# Patient Record
Sex: MALE | Race: WHITE | NOT HISPANIC OR LATINO | Employment: OTHER | URBAN - METROPOLITAN AREA
[De-identification: names, ages, dates, MRNs, and addresses within clinical notes are randomized per-mention and may not be internally consistent; named-entity substitution may affect disease eponyms.]

---

## 2018-12-18 ENCOUNTER — OFFICE VISIT (OUTPATIENT)
Dept: INTERNAL MEDICINE | Facility: CLINIC | Age: 43
End: 2018-12-18
Payer: COMMERCIAL

## 2018-12-18 VITALS
HEART RATE: 74 BPM | BODY MASS INDEX: 29.31 KG/M2 | HEIGHT: 73 IN | TEMPERATURE: 99 F | SYSTOLIC BLOOD PRESSURE: 120 MMHG | DIASTOLIC BLOOD PRESSURE: 80 MMHG | WEIGHT: 221.13 LBS

## 2018-12-18 DIAGNOSIS — R41.840 LACK OF CONCENTRATION: Primary | ICD-10-CM

## 2018-12-18 DIAGNOSIS — F41.9 ANXIETY: ICD-10-CM

## 2018-12-18 PROCEDURE — 99202 OFFICE O/P NEW SF 15 MIN: CPT | Mod: S$GLB,,, | Performed by: FAMILY MEDICINE

## 2018-12-18 PROCEDURE — 99999 PR PBB SHADOW E&M-NEW PATIENT-LVL III: CPT | Mod: PBBFAC,,, | Performed by: FAMILY MEDICINE

## 2018-12-18 PROCEDURE — 3008F BODY MASS INDEX DOCD: CPT | Mod: CPTII,S$GLB,, | Performed by: FAMILY MEDICINE

## 2018-12-18 NOTE — PROGRESS NOTES
"Subjective:      Patient ID: Martín Bae is a 43 y.o. male.    Chief Complaint: Establish Care (focus issues/anxiety/)    HPI  42 yo male here to establish care.  Reports ADHD as middle/high school kid.  Used to see Dr. Macarena Ramos  Has some anxiety/depression hx.  Mostly anxiety related to inability to focus now.  Had been doing some therapy for a good while.  Feels he would benefit being on something for focus.  Has not had formal evaluation as adult.  Saw Dr. Boykin 2014, was advised to see someone.    He is off meds for anxiety, doing ok.  Occ HAs, feels probably tension/stress related.    Past Medical History:   Diagnosis Date    ADHD (attention deficit hyperactivity disorder)     Depression     Hepatitis A     Nephrolithiasis      Family History   Problem Relation Age of Onset    Breast cancer Mother     Hypertension Mother     Heart disease Father      Past Surgical History:   Procedure Laterality Date    VASECTOMY       Social History     Tobacco Use    Smoking status: Never Smoker    Smokeless tobacco: Never Used   Substance Use Topics    Alcohol use: Yes    Drug use: No       /80 (BP Location: Left arm, Patient Position: Sitting, BP Method: X-Large (Manual))   Pulse 74   Temp 98.7 °F (37.1 °C) (Oral)   Ht 6' 1" (1.854 m)   Wt 100.3 kg (221 lb 1.9 oz)   BMI 29.17 kg/m²     Review of Systems   Constitutional: Negative for activity change, appetite change, chills, diaphoresis, fatigue, fever and unexpected weight change.   HENT: Negative for hearing loss and tinnitus.    Eyes: Negative for visual disturbance.   Respiratory: Negative for cough, chest tightness, shortness of breath and wheezing.    Cardiovascular: Negative for chest pain, palpitations and leg swelling.   Gastrointestinal: Negative for abdominal distention and abdominal pain.   Genitourinary: Negative for difficulty urinating.   Musculoskeletal: Negative for arthralgias and back pain.   Neurological: Positive " for headaches. Negative for dizziness and weakness.   Psychiatric/Behavioral: Positive for decreased concentration. The patient is nervous/anxious.        Objective:     Physical Exam   Constitutional: He is oriented to person, place, and time. He appears well-developed and well-nourished.   HENT:   Right Ear: External ear normal.   Left Ear: External ear normal.   Mouth/Throat: Oropharynx is clear and moist.   Eyes: Pupils are equal, round, and reactive to light.   Neck: Neck supple.   Cardiovascular: Normal rate, regular rhythm and normal heart sounds.   No murmur heard.  Pulmonary/Chest: Effort normal and breath sounds normal. No stridor. No respiratory distress. He has no wheezes.   Lymphadenopathy:     He has no cervical adenopathy.   Neurological: He is alert and oriented to person, place, and time.   Psychiatric: He has a normal mood and affect. His behavior is normal. Judgment and thought content normal.   Nursing note and vitals reviewed.      Lab Results   Component Value Date    WBC 9.29 06/06/2013    HGB 14.4 06/06/2013    HCT 43.0 06/06/2013     06/06/2013    CHOL 162 06/06/2013    TRIG 223 (H) 06/06/2013    HDL 29 (L) 06/06/2013    ALT 17 06/06/2013    AST 14 06/06/2013     06/06/2013    K 4.4 06/06/2013     06/06/2013    CREATININE 1.1 06/06/2013    BUN 12 06/06/2013    CO2 27 06/06/2013       Assessment:     1. Lack of concentration    2. Anxiety         Plan:     Lack of concentration    Anxiety    Recommend healthy ways to decrease stress, exercise/yoga  Go for evaluation, Dr. Stover[facility]Dr[facility]  F/U with evaluation to discuss meds